# Patient Record
Sex: FEMALE | Race: WHITE | NOT HISPANIC OR LATINO | Employment: OTHER | URBAN - METROPOLITAN AREA
[De-identification: names, ages, dates, MRNs, and addresses within clinical notes are randomized per-mention and may not be internally consistent; named-entity substitution may affect disease eponyms.]

---

## 2017-01-05 ENCOUNTER — TELEPHONE (OUTPATIENT)
Dept: CARDIOLOGY | Facility: CLINIC | Age: 62
End: 2017-01-05

## 2017-01-05 RX ORDER — METOPROLOL SUCCINATE 25 MG/1
TABLET, EXTENDED RELEASE ORAL
Qty: 60 TABLET | Refills: 3 | Status: SHIPPED | OUTPATIENT
Start: 2017-01-05 | End: 2017-01-23 | Stop reason: DRUGHIGH

## 2017-01-05 NOTE — TELEPHONE ENCOUNTER
S/w pt, advised, she will take 2 tablets daily,monitor her b/p & HR x2wks, then email me w/ those results/amk    Med list utd

## 2017-01-05 NOTE — TELEPHONE ENCOUNTER
Pt called today w/ b/p readings as instructed. (see 12/22 ov) She continues to experience intermittent lightheadedness & h/a's which typically correlate w/higher b/p readings.    AM (1hr b/f meds)                                        PM   12//79                                                 140/90  12/24 119/81                                                  140/82  12//80                                                  148/87  12//80                                                  148/86                                   12//82                                                  151/84  12//86                                                  154/89  12//80                                                  153/85  12//74                                                  148/86  12//82                                                  154/72  1/1-118/81                                                      142/83  1/2-122/76                                                      127/82  1/3-145/80                                                      133/79  1/4-118/79                                                      147/94  1/5-128/81      She did not record her HR. She currently takes Toprol 25mg daily.  *As as side note her gyn is weaning her off of Estrace    Please advise. [phone]/amk

## 2017-01-20 NOTE — TELEPHONE ENCOUNTER
Patient faxed over there BP readings.  She is currently taking Toprol 25 mg 2 tablets daily.                     AM                                    PM  1/6/17    137/113  HR66               154/88   HR 61  1/7/17    112/73         71               159/84          66  1/8/17    133/82         62               153/88          69  1/9/17    136/80         64               157/127        66  1/10/17  138/90         63               151/86          66  1/11/17  123/77         68               132/83          69  1/12/17  118/78         70               118/80          68  1/13/17  118/73         68               152/88          65  1/14/17  141/82         63               136/84          67  1/15/17  133/80         66               143/85          69  1/16/17  141/79         60               151/81          66  1/17/17  123/80         69               157/87          73  1/18/17  154/88         60               137/84          63  1/19/17  135/86         64               137/81          72    Please Advise.      Patient's phone number is [phone] or [phone].

## 2017-01-23 NOTE — TELEPHONE ENCOUNTER
Informed patient of dose increase (50mg bid) she was a little concerned about the dose and asked that I call you to confirm this dose./ TUNG

## 2017-01-27 RX ORDER — METOPROLOL SUCCINATE 50 MG/1
50 TABLET, EXTENDED RELEASE ORAL 2 TIMES DAILY
Qty: 60 TABLET | Refills: 1 | Status: SHIPPED | OUTPATIENT
Start: 2017-01-27 | End: 2017-01-27 | Stop reason: DRUGHIGH

## 2017-01-27 RX ORDER — METOPROLOL SUCCINATE 100 MG/1
100 TABLET, EXTENDED RELEASE ORAL DAILY
Qty: 30 TABLET | Refills: 1 | Status: SHIPPED | OUTPATIENT
Start: 2017-01-27 | End: 2017-03-26 | Stop reason: SDUPTHER

## 2017-01-27 RX ORDER — METOPROLOL SUCCINATE 25 MG/1
TABLET, EXTENDED RELEASE ORAL
Qty: 90 TABLET | Refills: 0 | Status: SHIPPED | OUTPATIENT
Start: 2017-01-27 | End: 2017-01-27 | Stop reason: DRUGHIGH

## 2017-01-27 NOTE — TELEPHONE ENCOUNTER
Patient faxed copy of her BP readings (Taking Metoprolol Succ 50 mg bid):                            AM                      PM  1/23/17   130/78 HR 62            147/86   HR 68  1/24/17   130/82       64            141/82         66  1/25/17   141/79       62            141/83         64  1/26/17   146/82       62            134/80         65  1/27/17   125/78       58    Meggan Gamboa reviewed and advised that patient continue the same dose.  Patient does not have an follow up appt.  Patient asked if you want her to monitor and call in another week or if you are OK with her just continuing on from this point.  Patient is aware that we will not be responding back until next week rather to continue recording her readings and calling them in. / TUNG

## 2017-03-27 RX ORDER — METOPROLOL SUCCINATE 100 MG/1
TABLET, EXTENDED RELEASE ORAL
Qty: 90 TABLET | Refills: 1 | Status: SHIPPED | OUTPATIENT
Start: 2017-03-27 | End: 2017-09-27 | Stop reason: SDUPTHER

## 2017-05-04 ENCOUNTER — OFFICE VISIT (OUTPATIENT)
Dept: FAMILY MEDICINE CLINIC | Facility: CLINIC | Age: 62
End: 2017-05-04

## 2017-05-04 VITALS
DIASTOLIC BLOOD PRESSURE: 96 MMHG | TEMPERATURE: 98.3 F | BODY MASS INDEX: 24.83 KG/M2 | OXYGEN SATURATION: 98 % | WEIGHT: 149 LBS | HEIGHT: 65 IN | SYSTOLIC BLOOD PRESSURE: 158 MMHG | HEART RATE: 55 BPM

## 2017-05-04 DIAGNOSIS — I10 ESSENTIAL HYPERTENSION: ICD-10-CM

## 2017-05-04 DIAGNOSIS — I47.1 SUPRAVENTRICULAR TACHYCARDIA (HCC): ICD-10-CM

## 2017-05-04 DIAGNOSIS — Z00.00 PREVENTATIVE HEALTH CARE: ICD-10-CM

## 2017-05-04 DIAGNOSIS — Z13.9 SCREENING: Primary | ICD-10-CM

## 2017-05-04 DIAGNOSIS — R53.82 CHRONIC FATIGUE: ICD-10-CM

## 2017-05-04 DIAGNOSIS — I49.9 IRREGULAR HEART BEATS: Primary | ICD-10-CM

## 2017-05-04 DIAGNOSIS — Z79.899 ENCOUNTER FOR LONG-TERM (CURRENT) USE OF MEDICATIONS: ICD-10-CM

## 2017-05-04 DIAGNOSIS — Z78.0 POSTMENOPAUSAL: ICD-10-CM

## 2017-05-04 PROBLEM — L65.9 HAIR LOSS: Status: ACTIVE | Noted: 2017-05-04

## 2017-05-04 PROBLEM — I47.10 SUPRAVENTRICULAR TACHYCARDIA: Status: ACTIVE | Noted: 2017-05-04

## 2017-05-04 PROBLEM — M19.90 ARTHRITIS: Status: ACTIVE | Noted: 2017-05-04

## 2017-05-04 PROCEDURE — 99214 OFFICE O/P EST MOD 30 MIN: CPT | Performed by: INTERNAL MEDICINE

## 2017-05-04 RX ORDER — AMLODIPINE BESYLATE 2.5 MG/1
2.5 TABLET ORAL DAILY
Qty: 30 TABLET | Refills: 5 | Status: SHIPPED | OUTPATIENT
Start: 2017-05-04 | End: 2017-12-14 | Stop reason: SDDI

## 2017-05-05 LAB
ALBUMIN SERPL-MCNC: 4.5 G/DL (ref 3.5–5.2)
ALBUMIN/GLOB SERPL: 1.9 G/DL
ALP SERPL-CCNC: 84 U/L (ref 39–117)
ALT SERPL-CCNC: 23 U/L (ref 1–33)
AST SERPL-CCNC: 26 U/L (ref 1–32)
BASOPHILS # BLD AUTO: 0.04 10*3/MM3 (ref 0–0.2)
BASOPHILS NFR BLD AUTO: 0.7 % (ref 0–1.5)
BILIRUB SERPL-MCNC: 0.3 MG/DL (ref 0.1–1.2)
BUN SERPL-MCNC: 16 MG/DL (ref 8–23)
BUN/CREAT SERPL: 21.3 (ref 7–25)
CALCIUM SERPL-MCNC: 10.4 MG/DL (ref 8.6–10.5)
CHLORIDE SERPL-SCNC: 102 MMOL/L (ref 98–107)
CHOLEST SERPL-MCNC: 251 MG/DL (ref 0–200)
CO2 SERPL-SCNC: 30.1 MMOL/L (ref 22–29)
CREAT SERPL-MCNC: 0.75 MG/DL (ref 0.57–1)
EOSINOPHIL # BLD AUTO: 0.13 10*3/MM3 (ref 0–0.7)
EOSINOPHIL NFR BLD AUTO: 2.3 % (ref 0.3–6.2)
ERYTHROCYTE [DISTWIDTH] IN BLOOD BY AUTOMATED COUNT: 12.7 % (ref 11.7–13)
FT4I SERPL CALC-MCNC: 1.8 (ref 1.2–4.9)
GLOBULIN SER CALC-MCNC: 2.4 GM/DL
GLUCOSE SERPL-MCNC: 99 MG/DL (ref 65–99)
HCT VFR BLD AUTO: 42.2 % (ref 35.6–45.5)
HDLC SERPL-MCNC: 78 MG/DL (ref 40–60)
HGB BLD-MCNC: 14.3 G/DL (ref 11.9–15.5)
IMM GRANULOCYTES # BLD: 0 10*3/MM3 (ref 0–0.03)
IMM GRANULOCYTES NFR BLD: 0 % (ref 0–0.5)
LDLC SERPL CALC-MCNC: 143 MG/DL (ref 0–100)
LDLC/HDLC SERPL: 1.83 {RATIO}
LYMPHOCYTES # BLD AUTO: 2.06 10*3/MM3 (ref 0.9–4.8)
LYMPHOCYTES NFR BLD AUTO: 36.1 % (ref 19.6–45.3)
MCH RBC QN AUTO: 31.4 PG (ref 26.9–32)
MCHC RBC AUTO-ENTMCNC: 33.9 G/DL (ref 32.4–36.3)
MCV RBC AUTO: 92.7 FL (ref 80.5–98.2)
MONOCYTES # BLD AUTO: 0.35 10*3/MM3 (ref 0.2–1.2)
MONOCYTES NFR BLD AUTO: 6.1 % (ref 5–12)
NEUTROPHILS # BLD AUTO: 3.12 10*3/MM3 (ref 1.9–8.1)
NEUTROPHILS NFR BLD AUTO: 54.8 % (ref 42.7–76)
PLATELET # BLD AUTO: 224 10*3/MM3 (ref 140–500)
POTASSIUM SERPL-SCNC: 5.2 MMOL/L (ref 3.5–5.2)
PROT SERPL-MCNC: 6.9 G/DL (ref 6–8.5)
RBC # BLD AUTO: 4.55 10*6/MM3 (ref 3.9–5.2)
SODIUM SERPL-SCNC: 143 MMOL/L (ref 136–145)
T3RU NFR SERPL: 26 % (ref 24–39)
T4 SERPL-MCNC: 7.1 UG/DL (ref 4.5–12)
TRIGL SERPL-MCNC: 152 MG/DL (ref 0–150)
TSH SERPL DL<=0.005 MIU/L-ACNC: 3.87 UIU/ML (ref 0.45–4.5)
VLDLC SERPL CALC-MCNC: 30.4 MG/DL (ref 5–40)
WBC # BLD AUTO: 5.7 10*3/MM3 (ref 4.5–10.7)

## 2017-05-06 LAB
HCV AB S/CO SERPL IA: <0.1 S/CO RATIO (ref 0–0.9)
Lab: NORMAL
WRITTEN AUTHORIZATION: NORMAL

## 2017-05-08 ENCOUNTER — TELEPHONE (OUTPATIENT)
Dept: CARDIOLOGY | Facility: CLINIC | Age: 62
End: 2017-05-08

## 2017-05-30 ENCOUNTER — TELEPHONE (OUTPATIENT)
Dept: FAMILY MEDICINE CLINIC | Facility: CLINIC | Age: 62
End: 2017-05-30

## 2017-09-27 RX ORDER — METOPROLOL SUCCINATE 100 MG/1
TABLET, EXTENDED RELEASE ORAL
Qty: 90 TABLET | Refills: 0 | Status: SHIPPED | OUTPATIENT
Start: 2017-09-27 | End: 2017-12-14 | Stop reason: SDUPTHER

## 2017-12-14 ENCOUNTER — OFFICE VISIT (OUTPATIENT)
Dept: CARDIOLOGY | Facility: CLINIC | Age: 62
End: 2017-12-14

## 2017-12-14 VITALS
HEART RATE: 57 BPM | HEIGHT: 64 IN | SYSTOLIC BLOOD PRESSURE: 152 MMHG | WEIGHT: 146 LBS | BODY MASS INDEX: 24.92 KG/M2 | DIASTOLIC BLOOD PRESSURE: 84 MMHG

## 2017-12-14 DIAGNOSIS — I10 ESSENTIAL HYPERTENSION: ICD-10-CM

## 2017-12-14 DIAGNOSIS — I47.1 SUPRAVENTRICULAR TACHYCARDIA (HCC): Primary | ICD-10-CM

## 2017-12-14 DIAGNOSIS — E78.2 MIXED HYPERLIPIDEMIA: ICD-10-CM

## 2017-12-14 PROCEDURE — 93000 ELECTROCARDIOGRAM COMPLETE: CPT | Performed by: INTERNAL MEDICINE

## 2017-12-14 PROCEDURE — 99214 OFFICE O/P EST MOD 30 MIN: CPT | Performed by: INTERNAL MEDICINE

## 2017-12-14 RX ORDER — METOPROLOL SUCCINATE 100 MG/1
TABLET, EXTENDED RELEASE ORAL
Qty: 90 TABLET | Refills: 3 | Status: SHIPPED | OUTPATIENT
Start: 2017-12-14 | End: 2019-01-02 | Stop reason: SDUPTHER

## 2017-12-14 RX ORDER — AMLODIPINE BESYLATE 2.5 MG/1
2.5 TABLET ORAL DAILY
Qty: 30 TABLET | Refills: 11 | Status: SHIPPED | OUTPATIENT
Start: 2017-12-14 | End: 2018-12-06 | Stop reason: SDUPTHER

## 2017-12-14 RX ORDER — ATORVASTATIN CALCIUM 20 MG/1
20 TABLET, FILM COATED ORAL DAILY
Qty: 30 TABLET | Refills: 11 | Status: SHIPPED | OUTPATIENT
Start: 2017-12-14 | End: 2018-12-06 | Stop reason: SDUPTHER

## 2017-12-14 NOTE — PROGRESS NOTES
Date of Office Visit: 17  Encounter Provider: Anselmo Velasquez MD  Place of Service: [facility] CARDIOLOGY  Patient Name: Gricel Sheriff  :1955      Chief Complaint   Patient presents with   • Irregular Heart Beat     History of Present Illness  HPI Comments: Ms. Sheriff is a pleasant 62-year-old white female with history of supraventricular tachycardia, hypertension, and hyperlipidemia. She comes in for followup. The patient denies chest pain, pressure and heaviness. No shortness of breath, othopnea or PND. No palpitations, near syncope or syncope. No stroke type symptoms like paralysis, paresthesia, abrupt vision loss and dysarthria. No bleeding like blood in the stool or dark stools.   Unfortunately she's been under significant mental stress.  Her father passed away in the last year or so in she's been taking care of her mom is been more to take care of and she thought in she's been a little more stress than that.  She does have some hard heart beats which are little bit different than the palpitations she doesn't in the past.  She also had blood pressures that she brought in and they are certainly intermittently elevated his eyes 160 systolic with diastolics at times touching 100.  We tried increasing her Toprol her pressure improved we held off on amlodipine but now again high as stated.    Palpitations    Pertinent negatives include no diaphoresis, dizziness, fever, malaise/fatigue, nausea, numbness, vomiting or weakness.         Past Medical History:   Diagnosis Date   • SVT (supraventricular tachycardia)          Past Surgical History:   Procedure Laterality Date   • EXCISIONAL HEMORRHOIDECTOMY  2008    Dr. Winter   • EYE MUSCLE SURGERY  1964    required 2 procedures   • HYSTERECTOMY     • VARICOSE VEIN SURGERY  2010    Lou         Current Outpatient Prescriptions on File Prior to Visit   Medication Sig Dispense Refill   • aspirin 81 MG EC  tablet Take 81 mg by mouth Daily.     • Calcium Carbonate (CALTRATE 600) 1500 (600 CA) MG tablet Take  by mouth Daily.     • Multiple Vitamin (MULTI-DAY VITAMINS PO) Take  by mouth Daily.     • [DISCONTINUED] amLODIPine (NORVASC) 2.5 MG tablet Take 1 tablet by mouth Daily. 30 tablet 5   • [DISCONTINUED] metoprolol succinate XL (TOPROL-XL) 100 MG 24 hr tablet TAKE ONE TABLET BY MOUTH DAILY (Patient taking differently: take 1/2 tablet twice daily) 90 tablet 0     No current facility-administered medications on file prior to visit.          Social History     Social History   • Marital status:      Spouse name: N/A   • Number of children: 1   • Years of education: 12 + 8     Occupational History   • Homemaker      Social History Main Topics   • Smoking status: Never Smoker   • Smokeless tobacco: Never Used   • Alcohol use No      Comment: rare Pacheco drink   • Drug use: No   • Sexual activity: Yes     Partners: Male     Other Topics Concern   • Not on file     Social History Narrative    Retired teacher        Living will at home        Exercise walks quite a bit       Family History   Problem Relation Age of Onset   • Heart attack Mother    • Heart failure Mother    • Hypertension Mother    • Heart disease Father    • Diabetes type II Father    • Breast cancer Maternal Grandmother          Review of Systems   Constitution: Negative for decreased appetite, diaphoresis, fever, weakness, malaise/fatigue, weight gain and weight loss.   HENT: Negative for congestion, hearing loss, nosebleeds and tinnitus.    Eyes: Negative for blurred vision, double vision, vision loss in left eye, vision loss in right eye and visual disturbance.   Cardiovascular: Positive for palpitations.        As noted in HPI   Respiratory:        As noted HPI   Endocrine: Negative for cold intolerance and heat intolerance.   Hematologic/Lymphatic: Negative for bleeding problem. Does not bruise/bleed easily.   Skin: Negative for color change,  "flushing, itching and rash.   Musculoskeletal: Positive for joint pain. Negative for arthritis, back pain, joint swelling, muscle weakness and myalgias.   Gastrointestinal: Negative for bloating, abdominal pain, constipation, diarrhea, dysphagia, heartburn, hematemesis, hematochezia, melena, nausea and vomiting.   Genitourinary: Negative for bladder incontinence, dysuria, frequency, nocturia and urgency.   Neurological: Negative for dizziness, focal weakness, headaches, light-headedness, loss of balance, numbness, paresthesias and vertigo.   Psychiatric/Behavioral: Negative for depression, memory loss and substance abuse.       Procedures      ECG 12 Lead  Date/Time: 12/14/2017 11:22 AM  Performed by: MARY MCCLELLAN  Authorized by: MARY MCCLELLAN   Comparison: compared with previous ECG   Similar to previous ECG  Rhythm: sinus rhythm  Rate: normal  ST Depression: II, III and aVF  QRS axis: normal                 Objective:    /84 (BP Location: Right arm)  Pulse 57  Ht 162.6 cm (64\")  Wt 66.2 kg (146 lb)  BMI 25.06 kg/m2       Physical Exam  Physical Exam   Constitutional: She is oriented to person, place, and time. She appears well-developed and well-nourished. No distress.   HENT:   Head: Normocephalic.   Eyes: Conjunctivae are normal. Pupils are equal, round, and reactive to light. No scleral icterus.   Neck: Normal carotid pulses, no hepatojugular reflux and no JVD present. Carotid bruit is not present. No tracheal deviation, no edema and no erythema present. No thyromegaly present.   Cardiovascular: Normal rate, regular rhythm, S1 normal, S2 normal, normal heart sounds and intact distal pulses.   No extrasystoles are present. PMI is not displaced.  Exam reveals no gallop, no distant heart sounds and no friction rub.    No murmur heard.  Pulses:       Carotid pulses are 2+ on the right side, and 2+ on the left side.       Radial pulses are 2+ on the right side, and 2+ on the left side.        " Femoral pulses are 2+ on the right side, and 2+ on the left side.       Dorsalis pedis pulses are 2+ on the right side, and 2+ on the left side.        Posterior tibial pulses are 2+ on the right side, and 2+ on the left side.   Pulmonary/Chest: Effort normal and breath sounds normal. No respiratory distress. She has no decreased breath sounds. She has no wheezes. She has no rhonchi. She has no rales. She exhibits no tenderness.   Abdominal: Soft. Bowel sounds are normal. She exhibits no distension and no mass. There is no hepatosplenomegaly. There is no tenderness. There is no rebound and no guarding.   Musculoskeletal: She exhibits no edema, tenderness or deformity.   Neurological: She is alert and oriented to person, place, and time.   Skin: Skin is warm and dry. No rash noted. She is not diaphoretic. No cyanosis or erythema. No pallor. Nails show no clubbing.   Psychiatric: She has a normal mood and affect. Her speech is normal and behavior is normal. Judgment and thought content normal.           Assessment:   1.  This is a 62-year-old female with supraventricular tachycardia. Having some different type of palpitations but think these may be related to her high blood pressure will adjust her medication follow her clinically.  2.  History of abnormal chest x-ray nodule that turned out to be benign.  3.   hypertension.  It's obvious the increased dose of metoprolol has not can be enough we'll go ahead and start her on amlodipine 20 mg a day she'll call us if she has problems.  4.  Hyperlipidemia her LDL is 143 total cholesterol 250.  Her American College of cardiology risk of stroke or heart attack in the next 10 years is intermediate at 6.5%.  With proper risk factor modification could be 2.6%.  Rinne started on atorvastatin 20 mg a day try to get her blood pressure under better control.  Addition she's in a try to diet and exercise some and will revisit this in a year.  We can get her on lower doses her off  some of this medication.         Plan:

## 2018-12-06 RX ORDER — ATORVASTATIN CALCIUM 20 MG/1
TABLET, FILM COATED ORAL
Qty: 30 TABLET | Refills: 0 | Status: SHIPPED | OUTPATIENT
Start: 2018-12-06 | End: 2019-01-02 | Stop reason: SDUPTHER

## 2018-12-06 RX ORDER — AMLODIPINE BESYLATE 2.5 MG/1
TABLET ORAL
Qty: 30 TABLET | Refills: 0 | Status: SHIPPED | OUTPATIENT
Start: 2018-12-06 | End: 2019-01-02 | Stop reason: SDUPTHER

## 2018-12-20 ENCOUNTER — TELEPHONE (OUTPATIENT)
Dept: CARDIOLOGY | Facility: CLINIC | Age: 63
End: 2018-12-20

## 2018-12-20 ENCOUNTER — LAB (OUTPATIENT)
Dept: LAB | Facility: HOSPITAL | Age: 63
End: 2018-12-20

## 2018-12-20 ENCOUNTER — OFFICE VISIT (OUTPATIENT)
Dept: CARDIOLOGY | Facility: CLINIC | Age: 63
End: 2018-12-20

## 2018-12-20 VITALS
WEIGHT: 144.2 LBS | SYSTOLIC BLOOD PRESSURE: 140 MMHG | HEIGHT: 65 IN | BODY MASS INDEX: 24.03 KG/M2 | HEART RATE: 56 BPM | DIASTOLIC BLOOD PRESSURE: 80 MMHG

## 2018-12-20 DIAGNOSIS — E78.2 MIXED HYPERLIPIDEMIA: ICD-10-CM

## 2018-12-20 DIAGNOSIS — I47.1 SUPRAVENTRICULAR TACHYCARDIA (HCC): Primary | ICD-10-CM

## 2018-12-20 DIAGNOSIS — E78.2 MIXED HYPERLIPIDEMIA: Primary | ICD-10-CM

## 2018-12-20 DIAGNOSIS — I10 ESSENTIAL HYPERTENSION: ICD-10-CM

## 2018-12-20 LAB
ALBUMIN SERPL-MCNC: 4.4 G/DL (ref 3.5–5.2)
ALP SERPL-CCNC: 114 U/L (ref 39–117)
ALT SERPL W P-5'-P-CCNC: 38 U/L (ref 1–33)
AST SERPL-CCNC: 37 U/L (ref 1–32)
BILIRUB CONJ SERPL-MCNC: <0.2 MG/DL (ref 0–0.3)
BILIRUB INDIRECT SERPL-MCNC: ABNORMAL MG/DL
BILIRUB SERPL-MCNC: 0.6 MG/DL (ref 0.1–1.2)
CHOLEST SERPL-MCNC: 164 MG/DL (ref 0–200)
HDLC SERPL-MCNC: 77 MG/DL (ref 40–60)
LDLC SERPL CALC-MCNC: 72 MG/DL (ref 0–100)
LDLC/HDLC SERPL: 0.94 {RATIO}
PROT SERPL-MCNC: 7.1 G/DL (ref 6–8.5)
TRIGL SERPL-MCNC: 75 MG/DL (ref 0–150)
VLDLC SERPL-MCNC: 15 MG/DL (ref 5–40)

## 2018-12-20 PROCEDURE — 80076 HEPATIC FUNCTION PANEL: CPT

## 2018-12-20 PROCEDURE — 99214 OFFICE O/P EST MOD 30 MIN: CPT | Performed by: INTERNAL MEDICINE

## 2018-12-20 PROCEDURE — 80061 LIPID PANEL: CPT | Performed by: INTERNAL MEDICINE

## 2018-12-20 PROCEDURE — 36415 COLL VENOUS BLD VENIPUNCTURE: CPT

## 2018-12-20 NOTE — TELEPHONE ENCOUNTER
Gricel Sheriff  Female, 63 y.o., 1955  PCP:   Johnnie Curtis MD  Language:   English  Need Interp:   No  Last Weight:   65.4 kg (144 lb 3.2 oz)  Phone:   M: [phone] H: [phone]  Allergies  No Known Allergies  Health Maintenance:   Due  FYI:   None  Primary Ins.:   JESSICA HERNANDEZ CROSS  MRN:   [id number]  MyChart:   Code Exp  Pharmacy:   [address] [phone] [facility] [phone] FX [[id number]]  Preferred Lab:   None  Next Appt with Me:   12/19/2019  Next Appt Date by Dept:   12/19/2019        Contains abnormal data Lipid Panel   Order: [id number]   Status:  Final result   Visible to patient:  No (Not Released) Dx:  Mixed hyperlipidemia    Ref Range & Units 12:02 1yr ago   Total Cholesterol 0 - 200 mg/dL 164  251 Abnormally high     Triglycerides 0 - 150 mg/dL 75  152 Abnormally high     HDL Cholesterol 40 - 60 mg/dL 77 Abnormally high   78 Abnormally high     LDL Cholesterol  0 - 100 mg/dL 72  143 Abnormally high     VLDL Cholesterol 5 - 40 mg/dL 15  30.4    LDL/HDL Ratio  0.94  1.83    Resulting Agency  [id number]      Narrative   Performed by: [facility]   Cholesterol Reference Ranges  (U.S. Department of Health and Human Services ATP III Classifications)    Desirable          <200 mg/dL  Borderline High    200-239 mg/dL  High Risk          >240 mg/dL      Triglyceride Reference Ranges  (U.S. Department of Health and Human Services ATP III Classifications)    Normal           <150 mg/dL  Borderline High  150-199 mg/dL  High             200-499 mg/dL  Very High        >500 mg/dL    HDL Reference Ranges  (U.S. Department of Health and Human Services ATP III Classifcations)    Low     <40 mg/dl (major risk factor for CHD)  High    >60 mg/dl ('negative' risk factor for CHD)        LDL Reference Ranges  (U.S. Department of Health and Human Services ATP III Classifcations)    Optimal          <100 mg/dL  Near Optimal      100-129 mg/dL  Borderline High  130-159 mg/dL  High             160-189 mg/dL  Very High        >189 mg/dL      Specimen Collected: 12/20/18 12:02 Last Resulted: 12/20/18 13:00         Lab Flowsheet       Order Details       View Encounter       Lab and Collection Details       Routing       Result History            Related Result Highlights         Hepatic Function Panel  Final result 12/20/2018                  Status of Other Orders     Expected    Holter Monitor - 24 Hour 12/20/18      Completed     Hepatic Function Panel Abnormal 12/20/18          Encounter Notes      All notes         Routing History     Priority Sent On From To Message Type    12/20/2018  1:00 PM Lab, Background User Anselmo Velasquez MD Results

## 2018-12-20 NOTE — PROGRESS NOTES
Date of Office Visit: 18  Encounter Provider: Anselmo Velasquez MD  Place of Service: [facility] CARDIOLOGY  Patient Name: Gricel Sheriff  :1955  Referral Provider:No ref. provider found      Chief Complaint   Patient presents with   • Palpitations     History of Present Illness  Ms. Sheriff is a pleasant 63-year-old white female with history of supraventricular tachycardia, hypertension, and hyperlipidemia. She comes in for followup. The patient denies chest pain, pressure and heaviness. No shortness of breath, othopnea or PND.  Unfortunately she's having increased episodes of palpitations where she gets this irregular beating but that's different this time it seems to be more significant she feels like she has to catch her breath and coughs is a couple times a day resolves, no associated near syncope or syncope. No stroke type symptoms like paralysis, paresthesia, abrupt vision loss and dysarthria. No bleeding like blood in the stool or dark stools.  She also has has had some fatigue.  She also caring for her mom still which is gradually getting a little more intense to do.      Palpitations    Pertinent negatives include no diaphoresis, dizziness, fever, malaise/fatigue, nausea, numbness, vomiting or weakness.         Past Medical History:   Diagnosis Date   • SVT (supraventricular tachycardia) (CMS/HCC)          Past Surgical History:   Procedure Laterality Date   • EXCISIONAL HEMORRHOIDECTOMY  2008    Dr. Winter   • EYE MUSCLE SURGERY  1964    required 2 procedures   • HYSTERECTOMY     • VARICOSE VEIN SURGERY  2010    Lou         Current Outpatient Medications on File Prior to Visit   Medication Sig Dispense Refill   • amLODIPine (NORVASC) 2.5 MG tablet TAKE ONE TABLET BY MOUTH DAILY 30 tablet 0   • aspirin 81 MG EC tablet Take 81 mg by mouth Daily.     • atorvastatin (LIPITOR) 20 MG tablet TAKE ONE TABLET BY MOUTH DAILY 30 tablet 0   • Calcium  Carbonate (CALTRATE 600) 1500 (600 CA) MG tablet Take  by mouth Daily.     • metoprolol succinate XL (TOPROL-XL) 100 MG 24 hr tablet Take 1/2 tablet twice daily 90 tablet 3   • Multiple Vitamin (MULTI-DAY VITAMINS PO) Take  by mouth Daily.       No current facility-administered medications on file prior to visit.          Social History     Socioeconomic History   • Marital status:      Spouse name: Not on file   • Number of children: 1   • Years of education: 12 + 8   • Highest education level: Not on file   Social Needs   • Financial resource strain: Not on file   • Food insecurity - worry: Not on file   • Food insecurity - inability: Not on file   • Transportation needs - medical: Not on file   • Transportation needs - non-medical: Not on file   Occupational History   • Occupation: Homemaker   Tobacco Use   • Smoking status: Never Smoker   • Smokeless tobacco: Never Used   Substance and Sexual Activity   • Alcohol use: No     Comment: rare Pacheco drink   • Drug use: No   • Sexual activity: Yes     Partners: Male   Other Topics Concern   • Not on file   Social History Narrative    Retired teacher        Living will at home        Exercise walks quite a bit       Family History   Problem Relation Age of Onset   • Heart attack Mother    • Heart failure Mother    • Hypertension Mother    • Heart disease Father    • Diabetes type II Father    • Breast cancer Maternal Grandmother          Review of Systems   Constitution: Negative for decreased appetite, diaphoresis, fever, weakness, malaise/fatigue, weight gain and weight loss.   HENT: Negative for congestion, hearing loss, nosebleeds and tinnitus.    Eyes: Negative for blurred vision, double vision, vision loss in left eye, vision loss in right eye and visual disturbance.   Cardiovascular:        As noted in HPI   Respiratory:        As noted HPI   Endocrine: Negative for cold intolerance and heat intolerance.   Hematologic/Lymphatic: Negative for bleeding  "problem. Does not bruise/bleed easily.   Skin: Negative for color change, flushing, itching and rash.   Musculoskeletal: Negative for arthritis, back pain, joint pain, joint swelling, muscle weakness and myalgias.   Gastrointestinal: Negative for bloating, abdominal pain, constipation, diarrhea, dysphagia, heartburn, hematemesis, hematochezia, melena, nausea and vomiting.   Genitourinary: Negative for bladder incontinence, dysuria, frequency, nocturia and urgency.   Neurological: Negative for dizziness, focal weakness, headaches, light-headedness, loss of balance, numbness, paresthesias and vertigo.   Psychiatric/Behavioral: Negative for depression, memory loss and substance abuse.       Procedures    Procedures        Objective:    /80 (BP Location: Right arm)   Pulse 56   Ht 163.8 cm (64.5\")   Wt 65.4 kg (144 lb 3.2 oz)   BMI 24.37 kg/m²        Physical Exam  Physical Exam   Constitutional: She is oriented to person, place, and time. She appears well-developed and well-nourished. No distress.   HENT:   Head: Normocephalic.   Eyes: Conjunctivae are normal. Pupils are equal, round, and reactive to light. No scleral icterus.   Neck: Normal carotid pulses, no hepatojugular reflux and no JVD present. Carotid bruit is not present. No tracheal deviation, no edema and no erythema present. No thyromegaly present.   Cardiovascular: Normal rate, regular rhythm, S1 normal, S2 normal, normal heart sounds and intact distal pulses.  No extrasystoles are present. PMI is not displaced. Exam reveals no gallop, no distant heart sounds and no friction rub.   No murmur heard.  Pulses:       Carotid pulses are 2+ on the right side, and 2+ on the left side.       Radial pulses are 2+ on the right side, and 2+ on the left side.        Femoral pulses are 2+ on the right side, and 2+ on the left side.       Dorsalis pedis pulses are 2+ on the right side, and 2+ on the left side.        Posterior tibial pulses are 2+ on the right " side, and 2+ on the left side.   Pulmonary/Chest: Effort normal and breath sounds normal. No respiratory distress. She has no decreased breath sounds. She has no wheezes. She has no rhonchi. She has no rales. She exhibits no tenderness.   Abdominal: Soft. Bowel sounds are normal. She exhibits no distension and no mass. There is no hepatosplenomegaly. There is no tenderness. There is no rebound and no guarding.   Musculoskeletal: She exhibits no edema, tenderness or deformity.   Neurological: She is alert and oriented to person, place, and time.   Skin: Skin is warm and dry. No rash noted. She is not diaphoretic. No cyanosis or erythema. No pallor. Nails show no clubbing.   Psychiatric: She has a normal mood and affect. Her speech is normal and behavior is normal. Judgment and thought content normal.           Assessment:   1.  This is a 63-year-old female with supraventricular tachycardia.  unfortunately she's having increased palpitations and probably increased episodes of SVT.  She's getting them daily she'll wear a 24-hour Holter monitor.  If that confirms increased SVT will refer her to arrhythmia service.  2.  History of abnormal chest x-ray nodule that turned out to be benign.  3.  Hypertension.   blood pressure stable to continue the same.  4.  Hyperlipidemia on atorvastatin to have a follow up lipid profile. If stable we will continue the same.         Plan:

## 2018-12-26 ENCOUNTER — TELEPHONE (OUTPATIENT)
Dept: CARDIOLOGY | Facility: CLINIC | Age: 63
End: 2018-12-26

## 2018-12-26 NOTE — TELEPHONE ENCOUNTER
Result Text        · A relatively benign monitor study.     1.  Normal sinus rhythm  2.  Rare predominantly unifocal PVCs.  One couplet identified.  No ventricular tachycardia.  3.  Rare PACs.  No atrial fibrillation or SVT identified  4.  No pauses or high degree AV block  5.  No symptoms reported

## 2019-01-02 RX ORDER — AMLODIPINE BESYLATE 2.5 MG/1
2.5 TABLET ORAL DAILY
Qty: 90 TABLET | Refills: 2 | Status: SHIPPED | OUTPATIENT
Start: 2019-01-02 | End: 2019-09-29 | Stop reason: SDUPTHER

## 2019-01-02 RX ORDER — METOPROLOL SUCCINATE 100 MG/1
TABLET, EXTENDED RELEASE ORAL
Qty: 90 TABLET | Refills: 2 | Status: SHIPPED | OUTPATIENT
Start: 2019-01-02 | End: 2019-09-29 | Stop reason: SDUPTHER

## 2019-01-02 RX ORDER — ATORVASTATIN CALCIUM 20 MG/1
20 TABLET, FILM COATED ORAL DAILY
Qty: 90 TABLET | Refills: 1 | Status: SHIPPED | OUTPATIENT
Start: 2019-01-02 | End: 2019-06-30 | Stop reason: SDUPTHER

## 2019-05-24 ENCOUNTER — TRANSCRIBE ORDERS (OUTPATIENT)
Dept: ADMINISTRATIVE | Facility: HOSPITAL | Age: 64
End: 2019-05-24

## 2019-05-24 DIAGNOSIS — I47.1 SVT (SUPRAVENTRICULAR TACHYCARDIA) (HCC): Primary | ICD-10-CM

## 2019-05-30 ENCOUNTER — APPOINTMENT (OUTPATIENT)
Dept: GENERAL RADIOLOGY | Facility: HOSPITAL | Age: 64
End: 2019-05-30

## 2019-07-01 RX ORDER — ATORVASTATIN CALCIUM 20 MG/1
TABLET, FILM COATED ORAL
Qty: 90 TABLET | Refills: 1 | Status: SHIPPED | OUTPATIENT
Start: 2019-07-01 | End: 2019-12-30

## 2019-07-01 NOTE — TELEPHONE ENCOUNTER
Received Lipids per Dr. Johnnie Curtis. Refilled atorvastatin 20 mg to local pharmacy. Called pt back and informed her refill was sent in. She Verbalized understanding.  /Nivia

## 2019-07-01 NOTE — TELEPHONE ENCOUNTER
Spoke w/ pt. RE: Medication refill. Told pt. She was overdue for lipid panel labs. Pt. Stated she had blood work done at general physician's office recently and will call to see if they were lipids done. Told pt if yes, have them faxed to [facility] [phone]. Once received. I will put in refill for atorvastatin 20 mg. She verbalized understanding.   /Nivia

## 2019-07-02 ENCOUNTER — TELEPHONE (OUTPATIENT)
Dept: CARDIOLOGY | Facility: CLINIC | Age: 64
End: 2019-07-02

## 2019-09-30 RX ORDER — METOPROLOL SUCCINATE 100 MG/1
TABLET, EXTENDED RELEASE ORAL
Qty: 90 TABLET | Refills: 1 | Status: SHIPPED | OUTPATIENT
Start: 2019-09-30 | End: 2020-03-20

## 2019-09-30 RX ORDER — AMLODIPINE BESYLATE 2.5 MG/1
TABLET ORAL
Qty: 90 TABLET | Refills: 1 | Status: SHIPPED | OUTPATIENT
Start: 2019-09-30 | End: 2020-03-20

## 2019-11-20 NOTE — TELEPHONE ENCOUNTER
Those look good she should check twice a day 1-2 times a month. She should keep her apt with me in a year.DOMINICK   Alert and oriented, no focal deficits, no motor or sensory deficits.

## 2019-12-19 ENCOUNTER — OFFICE VISIT (OUTPATIENT)
Dept: CARDIOLOGY | Facility: CLINIC | Age: 64
End: 2019-12-19

## 2019-12-19 VITALS
BODY MASS INDEX: 25.44 KG/M2 | SYSTOLIC BLOOD PRESSURE: 120 MMHG | HEART RATE: 52 BPM | WEIGHT: 149 LBS | HEIGHT: 64 IN | DIASTOLIC BLOOD PRESSURE: 70 MMHG

## 2019-12-19 DIAGNOSIS — I10 ESSENTIAL HYPERTENSION: ICD-10-CM

## 2019-12-19 DIAGNOSIS — I49.3 PVC (PREMATURE VENTRICULAR CONTRACTION): ICD-10-CM

## 2019-12-19 DIAGNOSIS — E78.2 MIXED HYPERLIPIDEMIA: ICD-10-CM

## 2019-12-19 DIAGNOSIS — I47.1 SUPRAVENTRICULAR TACHYCARDIA (HCC): Primary | ICD-10-CM

## 2019-12-19 PROCEDURE — 93000 ELECTROCARDIOGRAM COMPLETE: CPT | Performed by: INTERNAL MEDICINE

## 2019-12-19 PROCEDURE — 99214 OFFICE O/P EST MOD 30 MIN: CPT | Performed by: INTERNAL MEDICINE

## 2019-12-19 NOTE — PROGRESS NOTES
Date of Office Visit: 19  Encounter Provider: Anselmo Velasquez MD  Place of Service: [facility] CARDIOLOGY  Patient Name: Gricel Sheriff  :1955  Referral Provider:Anselmo Velasquez MD      Chief Complaint   Patient presents with   • Supraventricular tachycardia     History of Present Illness  Ms. Sheriff is a pleasant 64-year-old white female with history of supraventricular tachycardia, hypertension, and hyperlipidemia. She comes in for followup. The patient denies chest pain, pressure and heaviness. No shortness of breath, othopnea or PND. No palpitations, near syncope or syncope. No stroke type symptoms like paralysis, paresthesia, abrupt vision loss and dysarthria. No bleeding like blood in the stool or dark stools.  She does still get occasional fluttering but much better than it was last year she typically notices it when she lies down it lasts a few seconds gets maybe 2 or 3 times a week no associated symptoms with it.  The stress at home is also been a lot less since she was last year.    Palpitations    Pertinent negatives include no diaphoresis, dizziness, fever, malaise/fatigue, nausea, numbness, vomiting or weakness.         Past Medical History:   Diagnosis Date   • SVT (supraventricular tachycardia) (CMS/HCC)          Past Surgical History:   Procedure Laterality Date   • EXCISIONAL HEMORRHOIDECTOMY  2008    Dr. Winter   • EYE MUSCLE SURGERY  1964    required 2 procedures   • HYSTERECTOMY     • VARICOSE VEIN SURGERY  2010    Lou         Current Outpatient Medications on File Prior to Visit   Medication Sig Dispense Refill   • amLODIPine (NORVASC) 2.5 MG tablet TAKE ONE TABLET BY MOUTH DAILY 90 tablet 1   • aspirin 81 MG EC tablet Take 81 mg by mouth Daily.     • atorvastatin (LIPITOR) 20 MG tablet TAKE ONE TABLET BY MOUTH DAILY 90 tablet 1   • Calcium Carbonate (CALTRATE 600) 1500 (600 CA) MG tablet Take  by mouth Daily.     • metoprolol  succinate XL (TOPROL-XL) 100 MG 24 hr tablet TAKE ONE-HALF TABLET BY MOUTH TWICE A DAY 90 tablet 1   • Multiple Vitamin (MULTI-DAY VITAMINS PO) Take  by mouth Daily.       No current facility-administered medications on file prior to visit.          Social History     Socioeconomic History   • Marital status:      Spouse name: Not on file   • Number of children: 1   • Years of education: 12 + 8   • Highest education level: Not on file   Occupational History   • Occupation: Homemaker   Tobacco Use   • Smoking status: Never Smoker   • Smokeless tobacco: Never Used   • Tobacco comment: Daily caffeine use   Substance and Sexual Activity   • Alcohol use: No     Comment: rare [place] drink   • Drug use: No   • Sexual activity: Yes     Partners: Male   Lifestyle   • Physical activity:     Days per week: 5 days     Minutes per session: 30 min   • Stress: Not on file   Social History Narrative    Retired teacher        Living will at home        Exercise walks quite a bit       Family History   Problem Relation Age of Onset   • Heart attack Mother    • Heart failure Mother    • Hypertension Mother    • Heart disease Father    • Diabetes type II Father    • Breast cancer Maternal Grandmother          Review of Systems   Constitution: Negative for decreased appetite, diaphoresis, fever, malaise/fatigue, weight gain and weight loss.   HENT: Positive for hearing loss. Negative for congestion, nosebleeds and tinnitus.    Eyes: Negative for blurred vision, double vision, vision loss in left eye, vision loss in right eye and visual disturbance.   Cardiovascular:        As noted in HPI   Respiratory:        As noted HPI   Endocrine: Negative for cold intolerance and heat intolerance.   Hematologic/Lymphatic: Negative for bleeding problem. Does not bruise/bleed easily.   Skin: Negative for color change, flushing, itching and rash.   Musculoskeletal: Positive for joint pain. Negative for arthritis, back pain, joint swelling,  "muscle weakness and myalgias.   Gastrointestinal: Negative for bloating, abdominal pain, constipation, diarrhea, dysphagia, heartburn, hematemesis, hematochezia, melena, nausea and vomiting.   Genitourinary: Negative for bladder incontinence, dysuria, frequency, nocturia and urgency.   Neurological: Negative for dizziness, focal weakness, headaches, light-headedness, loss of balance, numbness, paresthesias, vertigo and weakness.   Psychiatric/Behavioral: Negative for depression, memory loss and substance abuse.       Procedures      ECG 12 Lead  Date/Time: 12/19/2019 12:06 PM  Performed by: Anselmo Velasquez MD  Authorized by: Anselmo Velasquez MD   Comparison: compared with previous ECG   Similar to previous ECG  Rate: normal  ST Depression: II, III, aVF and V6  QRS axis: normal                  Objective:    /70   Pulse 52   Ht 162.6 cm (64\")   Wt 67.6 kg (149 lb)   BMI 25.58 kg/m²        Physical Exam  Physical Exam   Constitutional: She is oriented to person, place, and time. She appears well-developed and well-nourished. No distress.   HENT:   Head: Normocephalic.   Eyes: Pupils are equal, round, and reactive to light. Conjunctivae are normal. No scleral icterus.   Neck: Normal carotid pulses, no hepatojugular reflux and no JVD present. Carotid bruit is not present. No tracheal deviation, no edema and no erythema present. No thyromegaly present.   Cardiovascular: Normal rate, regular rhythm, S1 normal, S2 normal, normal heart sounds and intact distal pulses.  Occasional extrasystoles are present. PMI is not displaced. Exam reveals no gallop, no distant heart sounds and no friction rub.   No murmur heard.  Pulses:       Carotid pulses are 2+ on the right side, and 2+ on the left side.       Radial pulses are 2+ on the right side, and 2+ on the left side.        Femoral pulses are 2+ on the right side, and 2+ on the left side.       Dorsalis pedis pulses are 2+ on the right side, and 2+ on the left " side.        Posterior tibial pulses are 2+ on the right side, and 2+ on the left side.   Pulmonary/Chest: Effort normal and breath sounds normal. No respiratory distress. She has no decreased breath sounds. She has no wheezes. She has no rhonchi. She has no rales. She exhibits no tenderness.   Abdominal: Soft. Bowel sounds are normal. She exhibits no distension and no mass. There is no hepatosplenomegaly. There is no tenderness. There is no rebound and no guarding.   Musculoskeletal: She exhibits no edema, tenderness or deformity.   Neurological: She is alert and oriented to person, place, and time.   Skin: Skin is warm and dry. No rash noted. She is not diaphoretic. No cyanosis or erythema. No pallor. Nails show no clubbing.   Psychiatric: She has a normal mood and affect. Her speech is normal and behavior is normal. Judgment and thought content normal.           Assessment:   1.  This is a 63-year-old female with supraventricular tachycardia.    Seems to be doing better.  She does have some evidence of PVCs also these also appear to be benign.  She is stable we will continue the same see us again in follow-up in a year.  2.  History of abnormal chest x-ray nodule that turned out to be benign.  3.  Hypertension.   blood pressure stable to continue the same.  4.  Hyperlipidemia on atorvastatin had follow-up lipid profiles in PCP however the year before that they were great.         Plan:

## 2019-12-30 RX ORDER — ATORVASTATIN CALCIUM 20 MG/1
TABLET, FILM COATED ORAL
Qty: 90 TABLET | Refills: 3 | Status: SHIPPED | OUTPATIENT
Start: 2019-12-30 | End: 2020-12-21 | Stop reason: SDUPTHER

## 2020-02-16 NOTE — PROGRESS NOTES
Date of Office Visit: 2019  Encounter Provider: Zoila Ferreira MA  Place of Service: [facility] CARDIOLOGY  Patient Name: Gricel Sheriff  :1955    Chief complaint      History of Present Illness      Past Medical History:   Diagnosis Date   • SVT (supraventricular tachycardia) (CMS/HCC)      Past Surgical History:   Procedure Laterality Date   • EXCISIONAL HEMORRHOIDECTOMY  2008    Dr. Winter   • EYE MUSCLE SURGERY  1964    required 2 procedures   • HYSTERECTOMY     • VARICOSE VEIN SURGERY  2010    Lou     Outpatient Medications Prior to Visit   Medication Sig Dispense Refill   • amLODIPine (NORVASC) 2.5 MG tablet TAKE ONE TABLET BY MOUTH DAILY 90 tablet 1   • aspirin 81 MG EC tablet Take 81 mg by mouth Daily.     • atorvastatin (LIPITOR) 20 MG tablet TAKE ONE TABLET BY MOUTH DAILY 90 tablet 1   • Calcium Carbonate (CALTRATE 600) 1500 (600 CA) MG tablet Take  by mouth Daily.     • metoprolol succinate XL (TOPROL-XL) 100 MG 24 hr tablet TAKE ONE-HALF TABLET BY MOUTH TWICE A DAY 90 tablet 1   • Multiple Vitamin (MULTI-DAY VITAMINS PO) Take  by mouth Daily.       No facility-administered medications prior to visit.        Allergies as of 2019   • (No Known Allergies)     Social History     Socioeconomic History   • Marital status:      Spouse name: Not on file   • Number of children: 1   • Years of education: 12 + 8   • Highest education level: Not on file   Occupational History   • Occupation: Homemaker   Tobacco Use   • Smoking status: Never Smoker   • Smokeless tobacco: Never Used   • Tobacco comment: Daily caffeine use   Substance and Sexual Activity   • Alcohol use: No     Comment: rare [place] drink   • Drug use: No   • Sexual activity: Yes     Partners: Male   Lifestyle   • Physical activity:     Days per week: 5 days     Minutes per session: 30 min   • Stress: Not on file   Social History Narrative    Retired teacher         "Living will at home        Exercise walks quite a bit     Family History   Problem Relation Age of Onset   • Heart attack Mother    • Heart failure Mother    • Hypertension Mother    • Heart disease Father    • Diabetes type II Father    • Breast cancer Maternal Grandmother      [place]     Objective:     Vitals:    12/19/19 1158   BP: 120/70   Pulse: 52   Weight: 67.6 kg (149 lb)   Height: 162.6 cm (64\")     Body mass index is 25.58 kg/m².    Physical Exam  Lab Review:     ECG 12 Lead  Date/Time: 12/19/2019 11:58 AM  Performed by: Anselmo Velasquez MD  Authorized by: Anselmo Velasquez MD             Assessment:    No diagnosis found.  Plan:                  Your medication list           Accurate as of December 19, 2019 11:58 AM. If you have any questions, ask your nurse or doctor.               CONTINUE taking these medications      Instructions Last Dose Given Next Dose Due   amLODIPine 2.5 MG tablet  Commonly known as:  NORVASC      TAKE ONE TABLET BY MOUTH DAILY       aspirin 81 MG EC tablet      Take 81 mg by mouth Daily.       atorvastatin 20 MG tablet  Commonly known as:  LIPITOR      TAKE ONE TABLET BY MOUTH DAILY       CALTRATE 600 1500 (600 Ca) MG tablet  Generic drug:  Calcium Carbonate      Take  by mouth Daily.       metoprolol succinate  MG 24 hr tablet  Commonly known as:  TOPROL-XL      TAKE ONE-HALF TABLET BY MOUTH TWICE A DAY       MULTI-DAY VITAMINS PO      Take  by mouth Daily.              Patient is no longer taking -.  I corrected the med list to reflect this.  I did not stop these medications.    Dictated utilizing Dragon dictation  " 5

## 2020-03-20 RX ORDER — METOPROLOL SUCCINATE 100 MG/1
TABLET, EXTENDED RELEASE ORAL
Qty: 90 TABLET | Refills: 0 | Status: SHIPPED | OUTPATIENT
Start: 2020-03-20 | End: 2020-06-26

## 2020-03-20 RX ORDER — AMLODIPINE BESYLATE 2.5 MG/1
TABLET ORAL
Qty: 90 TABLET | Refills: 0 | Status: SHIPPED | OUTPATIENT
Start: 2020-03-20 | End: 2020-06-26

## 2020-06-26 RX ORDER — METOPROLOL SUCCINATE 100 MG/1
TABLET, EXTENDED RELEASE ORAL
Qty: 90 TABLET | Refills: 1 | Status: SHIPPED | OUTPATIENT
Start: 2020-06-26 | End: 2020-12-21 | Stop reason: SDUPTHER

## 2020-06-26 RX ORDER — AMLODIPINE BESYLATE 2.5 MG/1
TABLET ORAL
Qty: 90 TABLET | Refills: 1 | Status: SHIPPED | OUTPATIENT
Start: 2020-06-26 | End: 2020-12-21 | Stop reason: SDUPTHER

## 2020-12-21 RX ORDER — AMLODIPINE BESYLATE 2.5 MG/1
2.5 TABLET ORAL DAILY
Qty: 90 TABLET | Refills: 2 | Status: SHIPPED | OUTPATIENT
Start: 2020-12-21 | End: 2021-09-22

## 2020-12-21 RX ORDER — METOPROLOL SUCCINATE 100 MG/1
50 TABLET, EXTENDED RELEASE ORAL 2 TIMES DAILY
Qty: 90 TABLET | Refills: 2 | Status: SHIPPED | OUTPATIENT
Start: 2020-12-21 | End: 2021-01-14 | Stop reason: SDUPTHER

## 2020-12-21 RX ORDER — ATORVASTATIN CALCIUM 20 MG/1
20 TABLET, FILM COATED ORAL DAILY
Qty: 90 TABLET | Refills: 3 | Status: SHIPPED | OUTPATIENT
Start: 2020-12-21 | End: 2021-12-20 | Stop reason: SDUPTHER

## 2020-12-21 NOTE — TELEPHONE ENCOUNTER
Eva patient  Requesting refill on Metoprolol Succinate, Amlidopine and Atorvastatin.   Last seen in office 12/19/2019  EKG 12/19/2019  Labs 5/23/2019

## 2021-01-14 ENCOUNTER — OFFICE VISIT (OUTPATIENT)
Dept: CARDIOLOGY | Facility: CLINIC | Age: 66
End: 2021-01-14

## 2021-01-14 VITALS
DIASTOLIC BLOOD PRESSURE: 62 MMHG | HEIGHT: 64 IN | HEART RATE: 52 BPM | BODY MASS INDEX: 24.92 KG/M2 | SYSTOLIC BLOOD PRESSURE: 130 MMHG | WEIGHT: 146 LBS

## 2021-01-14 DIAGNOSIS — I47.1 SUPRAVENTRICULAR TACHYCARDIA (HCC): Primary | ICD-10-CM

## 2021-01-14 DIAGNOSIS — I10 ESSENTIAL HYPERTENSION: ICD-10-CM

## 2021-01-14 DIAGNOSIS — I49.3 PVC (PREMATURE VENTRICULAR CONTRACTION): ICD-10-CM

## 2021-01-14 DIAGNOSIS — E78.2 MIXED HYPERLIPIDEMIA: ICD-10-CM

## 2021-01-14 PROCEDURE — 93000 ELECTROCARDIOGRAM COMPLETE: CPT | Performed by: NURSE PRACTITIONER

## 2021-01-14 PROCEDURE — 99213 OFFICE O/P EST LOW 20 MIN: CPT | Performed by: NURSE PRACTITIONER

## 2021-01-14 RX ORDER — METOPROLOL SUCCINATE 50 MG/1
50 TABLET, EXTENDED RELEASE ORAL 2 TIMES DAILY
Qty: 60 TABLET | Refills: 11
Start: 2021-01-14 | End: 2021-09-22

## 2021-01-14 NOTE — PROGRESS NOTES
"Date of Office Visit: 21  Encounter Provider: FILEMON Page  Place of Service: [facility] CARDIOLOGY  Patient Name: Gricel Sheriff  :1955    Chief Complaint   Patient presents with   • Supraventricular tachycardia (   • Follow-up   :     HPI: Gricel Sheriff is a 65 y.o. female  with history of hypertension, hyperlipidemia, supraventricular tachycardia, premature ventricular contraction.  She has been followed by Dr. Anselmo Velasquez.  I reviewed her last office visit note.  I will visit with her for the first time today.    She has been treated with metoprolol for history of SVT.  She was later noted to have premature ventricular contraction which was felt to be benign.  She now presents for reassessment.  She continues to take aspirin without any signs of bleeding.  She has occasional palpitations maybe twice a week.  These are sporadic and not limiting.  She also reports a deep skipped beat on occasion it makes her catch her breath.  She walks 3 days a week outside for 30 minutes when the weather permits.  She has no exertional symptoms with that.  She denies lightheadedness, shortness of breath, edema, chest pain tightness pressure.  He stays active.  She has occasional fatigue which is chronic and unchanged.  She checks her blood pressure at home which is reportedly good.        No Known Allergies        Family and social history reviewed.     ROS  All other systems were reviewed and are negative          Objective:     Vitals:    21 0858   BP: 130/62   BP Location: Left arm   Patient Position: Sitting   Pulse: 52   Weight: 66.2 kg (146 lb)   Height: 162.6 cm (64\")     Body mass index is 25.06 kg/m².    PHYSICAL EXAM:  Constitutional:       General: Not in acute distress.     Appearance: Well-developed. Not diaphoretic.   HENT:      Head: Normocephalic.   Pulmonary:      Effort: Pulmonary effort is normal. No respiratory distress.      Breath sounds: " Normal breath sounds. No wheezing. No rhonchi. No rales.   Cardiovascular:      Normal rate. Regular rhythm.   Pulses:     Radial: 2+ bilaterally.  Skin:     General: Skin is warm and dry. There is no cyanosis.      Findings: No rash.   Neurological:      Mental Status: Alert and oriented to person, place, and time.   Psychiatric:         Behavior: Behavior normal.         Thought Content: Thought content normal.         Judgment: Judgment normal.           ECG 12 Lead    Date/Time: 1/14/2021 9:13 AM  Performed by: Lovely Sahu APRN  Authorized by: Lovely Sahu APRN   Comparison: compared with previous ECG   Similar to previous ECG  Rhythm: sinus rhythm  Rate: normal  Other findings: non-specific ST-T wave changes  Comments: No ischemic changes              Current Outpatient Medications   Medication Sig Dispense Refill   • amLODIPine (NORVASC) 2.5 MG tablet Take 1 tablet by mouth Daily. 90 tablet 2   • aspirin 81 MG EC tablet Take 81 mg by mouth Daily.     • atorvastatin (LIPITOR) 20 MG tablet Take 1 tablet by mouth Daily. 90 tablet 3   • Calcium Carbonate (CALTRATE 600) 1500 (600 CA) MG tablet Take  by mouth Daily.     • metoprolol succinate XL (TOPROL-XL) 100 MG 24 hr tablet Take 0.5 tablets by mouth 2 (Two) Times a Day. 90 tablet 2   • Multiple Vitamin (MULTI-DAY VITAMINS PO) Take  by mouth Daily.       No current facility-administered medications for this visit.      Assessment:       Diagnosis Plan   1. Supraventricular tachycardia (CMS/HCC)     2. Essential hypertension     3. Mixed hyperlipidemia     4. PVC (premature ventricular contraction)          No orders of the defined types were placed in this encounter.        Plan:       1.  This is a 65-year-old female with supraventricular tachycardia and evidence of PVCs also these also appear to be benign.  continue ASA and beta blocker a current doses  2.  History of abnormal chest x-ray nodule that turned out to be benign.  3.  Hypertension.  blood  pressure stable to continue the same. She follows this at home  4.  Hyperlipidemia on atorvastatin 20 mg. Reviewed lipids from 05/2019 which were very good  5. History of varicose vein surgery     I will transition her care to Dr. Peraza. She is to call me with any questions or concerns.                It has been a pleasure to participate in this patient's care.      Thank you,  FILEMON Page      **I used Dragon to dictate this note:**

## 2021-02-28 ENCOUNTER — IMMUNIZATION (OUTPATIENT)
Dept: VACCINE CLINIC | Facility: HOSPITAL | Age: 66
End: 2021-02-28

## 2021-02-28 PROCEDURE — 0001A: CPT | Performed by: INTERNAL MEDICINE

## 2021-02-28 PROCEDURE — 91300 HC SARSCOV02 VAC 30MCG/0.3ML IM: CPT | Performed by: INTERNAL MEDICINE

## 2021-03-21 ENCOUNTER — IMMUNIZATION (OUTPATIENT)
Dept: VACCINE CLINIC | Facility: HOSPITAL | Age: 66
End: 2021-03-21

## 2021-03-21 PROCEDURE — 91300 HC SARSCOV02 VAC 30MCG/0.3ML IM: CPT | Performed by: INTERNAL MEDICINE

## 2021-03-21 PROCEDURE — 0002A: CPT | Performed by: INTERNAL MEDICINE

## 2021-09-22 RX ORDER — METOPROLOL SUCCINATE 100 MG/1
TABLET, EXTENDED RELEASE ORAL
Qty: 90 TABLET | Refills: 2 | Status: SHIPPED | OUTPATIENT
Start: 2021-09-22 | End: 2021-12-20 | Stop reason: SDUPTHER

## 2021-09-22 RX ORDER — AMLODIPINE BESYLATE 2.5 MG/1
TABLET ORAL
Qty: 90 TABLET | Refills: 2 | Status: SHIPPED | OUTPATIENT
Start: 2021-09-22 | End: 2021-12-20 | Stop reason: SDUPTHER

## 2021-12-04 ENCOUNTER — IMMUNIZATION (OUTPATIENT)
Dept: VACCINE CLINIC | Facility: HOSPITAL | Age: 66
End: 2021-12-04

## 2021-12-04 PROCEDURE — 0004A HC ADM SARSCOV2 30MCG/0.3ML BOOSTER: CPT | Performed by: INTERNAL MEDICINE

## 2021-12-04 PROCEDURE — 91300 HC SARSCOV02 VAC 30MCG/0.3ML IM: CPT | Performed by: INTERNAL MEDICINE

## 2021-12-20 RX ORDER — AMLODIPINE BESYLATE 2.5 MG/1
2.5 TABLET ORAL DAILY
Qty: 90 TABLET | Refills: 2 | Status: SHIPPED | OUTPATIENT
Start: 2021-12-20 | End: 2022-09-12

## 2021-12-20 RX ORDER — ATORVASTATIN CALCIUM 20 MG/1
20 TABLET, FILM COATED ORAL DAILY
Qty: 90 TABLET | Refills: 3 | Status: SHIPPED | OUTPATIENT
Start: 2021-12-20 | End: 2022-12-16

## 2021-12-20 RX ORDER — METOPROLOL SUCCINATE 100 MG/1
50 TABLET, EXTENDED RELEASE ORAL 2 TIMES DAILY
Qty: 90 TABLET | Refills: 2 | Status: SHIPPED | OUTPATIENT
Start: 2021-12-20 | End: 2022-09-12

## 2022-01-20 ENCOUNTER — OFFICE VISIT (OUTPATIENT)
Dept: CARDIOLOGY | Facility: CLINIC | Age: 67
End: 2022-01-20

## 2022-01-20 VITALS
OXYGEN SATURATION: 98 % | BODY MASS INDEX: 23.56 KG/M2 | WEIGHT: 138 LBS | HEIGHT: 64 IN | HEART RATE: 55 BPM | RESPIRATION RATE: 16 BRPM | DIASTOLIC BLOOD PRESSURE: 74 MMHG | SYSTOLIC BLOOD PRESSURE: 126 MMHG

## 2022-01-20 DIAGNOSIS — I47.1 SUPRAVENTRICULAR TACHYCARDIA: ICD-10-CM

## 2022-01-20 DIAGNOSIS — I10 ESSENTIAL HYPERTENSION: ICD-10-CM

## 2022-01-20 DIAGNOSIS — R00.2 PALPITATIONS: Primary | ICD-10-CM

## 2022-01-20 PROCEDURE — 93000 ELECTROCARDIOGRAM COMPLETE: CPT | Performed by: INTERNAL MEDICINE

## 2022-01-20 PROCEDURE — 99213 OFFICE O/P EST LOW 20 MIN: CPT | Performed by: INTERNAL MEDICINE

## 2022-01-20 RX ORDER — ACETAMINOPHEN 160 MG/5ML
SUSPENSION, ORAL (FINAL DOSE FORM) ORAL
COMMUNITY

## 2022-01-20 NOTE — PROGRESS NOTES
CARDIOLOGY    Zoila Peraza MD    ENCOUNTER DATE:  01/20/2022    Gricel Sheriff / 66 y.o. / female        CHIEF COMPLAINT / REASON FOR OFFICE VISIT     Rapid Heart Rate (1 year f/u )      HISTORY OF PRESENT ILLNESS       HPI    Gricel Sheriff is a 66 y.o. female     This is a patient who previously followed with Dr. Velasquez. She has hypertension, hyperlipidemia, supraventricular tachycardia, and PVC's. She comes in today for follow up. Overall, she has been feeling well. She denies any shortness of breath, edema, or chest pain. She tries to exercise and walk when the weather is good. She has an occasional palpitation, which sounds very consistent with a premature ventricular contraction. She notices them at rest. No exacerbating factors. She feels like she has to cough when she has one.       REVIEW OF SYSTEMS     Review of Systems   Constitutional: Negative for fever, malaise/fatigue, weight gain and weight loss.   HENT: Negative for ear pain, hearing loss, nosebleeds and sore throat.    Eyes: Negative for double vision, pain, vision loss in left eye and vision loss in right eye.   Cardiovascular:        See history of present illness.   Respiratory: Negative for cough, shortness of breath, sleep disturbances due to breathing, snoring and wheezing.    Endocrine: Negative for cold intolerance, heat intolerance and polyuria.   Skin: Negative for itching, poor wound healing and rash.   Musculoskeletal: Negative for joint pain, joint swelling and myalgias.   Gastrointestinal: Negative for abdominal pain, diarrhea, hematochezia, nausea and vomiting.   Genitourinary: Negative for hematuria and hesitancy.   Neurological: Negative for numbness, paresthesias and seizures.   Psychiatric/Behavioral: Negative for depression. The patient is not nervous/anxious.          VITAL SIGNS     Visit Vitals  /74 (BP Location: Left arm, Patient Position: Sitting, Cuff Size: Adult)   Pulse 55   Resp 16   Ht 162.6 cm  "(64\")   Wt 62.6 kg (138 lb)   SpO2 98%   BMI 23.69 kg/m²         Wt Readings from Last 3 Encounters:   01/20/22 62.6 kg (138 lb)   01/14/21 66.2 kg (146 lb)   12/19/19 67.6 kg (149 lb)     Body mass index is 23.69 kg/m².      PHYSICAL EXAMINATION     Constitutional:       General: Not in acute distress.  Neck:      Vascular: No carotid bruit or JVD.   Pulmonary:      Effort: Pulmonary effort is normal.      Breath sounds: Normal breath sounds.   Cardiovascular:      Normal rate. Regular rhythm.      Murmurs: There is no murmur.   Psychiatric:         Mood and Affect: Mood and affect normal.           REVIEWED DATA       ECG 12 Lead    Date/Time: 1/20/2022 10:34 AM  Performed by: Zoila Peraza MD  Authorized by: Zoila Peraza MD   Comparison: compared with previous ECG from 1/20/2021  Similar to previous ECG  Rhythm: sinus rhythm  BPM: 55  Conduction: conduction normal  ST Segments: ST segments normal  T Waves: T waves normal    Clinical impression: normal ECG                  Lab Results   Component Value Date    GLUCOSE 99 05/04/2017    BUN 16 05/04/2017    CREATININE 0.75 05/04/2017    EGFRIFNONA 79 05/04/2017    EGFRIFAFRI 96 05/04/2017    BCR 21.3 05/04/2017    K 5.2 05/04/2017    CO2 30.1 (H) 05/04/2017    CALCIUM 10.4 05/04/2017    PROTENTOTREF 6.9 05/04/2017    ALBUMIN 4.40 12/20/2018    LABIL2 1.9 05/04/2017    AST 37 (H) 12/20/2018    ALT 38 (H) 12/20/2018       ASSESSMENT & PLAN      Diagnosis Plan   1. Palpitations     2. Supraventricular tachycardia (HCC)     3. Essential hypertension           1. History of supraventricular tachycardia. None on recent studies. No symptoms indicate that she is having any sustained SVT.   2. Palpitations. Consistent with PVC's.   3. Hypertension. Blood pressure is well controlled.  4. Hyperlipidemia. Followed by her primary provider.     Follow up with Lovely in 1 year.       Orders Placed This Encounter   Procedures   • ECG 12 Lead     This order was created via " procedure documentation     Order Specific Question:   Release to patient     Answer:   Immediate           MEDICATIONS         Discharge Medications          Accurate as of January 20, 2022 10:34 AM. If you have any questions, ask your nurse or doctor.            Continue These Medications      Instructions Start Date   amLODIPine 2.5 MG tablet  Commonly known as: NORVASC   2.5 mg, Oral, Daily      aspirin 81 MG EC tablet   81 mg, Oral, Daily      atorvastatin 20 MG tablet  Commonly known as: LIPITOR   20 mg, Oral, Daily      Caltrate 600 1500 (600 Ca) MG tablet  Generic drug: Calcium Carbonate   Oral, Daily      metoprolol succinate  MG 24 hr tablet  Commonly known as: TOPROL-XL   50 mg, Oral, 2 Times Daily      multivitamin tablet tablet  Commonly known as: THERAGRAN   Oral, Daily      Vitamin C Drops 60 MG lozenge  Generic drug: Ascorbic Acid   Mouth/Throat, Takes in the winter                Zoila Peraza MD  01/20/22  10:34 EST    **Barber Disclaimer:   Much of this encounter note is an electronic transcription/translation of spoken language to printed text. The electronic translation of spoken language may permit erroneous, or at times, nonsensical words or phrases to be inadvertently transcribed. Although I have reviewed the note for such errors, some may still exist.

## 2022-09-12 RX ORDER — METOPROLOL SUCCINATE 100 MG/1
TABLET, EXTENDED RELEASE ORAL
Qty: 90 TABLET | Refills: 1 | Status: SHIPPED | OUTPATIENT
Start: 2022-09-12

## 2022-09-12 RX ORDER — AMLODIPINE BESYLATE 2.5 MG/1
TABLET ORAL
Qty: 90 TABLET | Refills: 1 | Status: SHIPPED | OUTPATIENT
Start: 2022-09-12

## 2022-09-12 NOTE — TELEPHONE ENCOUNTER
Please advise filling     Metoprolol      AND     Amlodipine      LOV   -   1/20/2022  JL    Next   -   1/26/2023  JL    Last labs   -   Nothing since 2019 Scanned labs.    Torie VALDEZ

## 2022-12-16 RX ORDER — ATORVASTATIN CALCIUM 20 MG/1
TABLET, FILM COATED ORAL
Qty: 90 TABLET | Refills: 2 | Status: SHIPPED | OUTPATIENT
Start: 2022-12-16

## 2023-09-11 RX ORDER — ATORVASTATIN CALCIUM 20 MG/1
TABLET, FILM COATED ORAL
Qty: 90 TABLET | Refills: 1 | Status: SHIPPED | OUTPATIENT
Start: 2023-09-11

## 2023-09-11 NOTE — TELEPHONE ENCOUNTER
Last office visit was canceled.  I have approved more refills for statin therapy.  She needs a follow-up scheduled with me or Dr. Peraza

## 2023-10-12 ENCOUNTER — HOSPITAL ENCOUNTER (OUTPATIENT)
Dept: BONE DENSITY | Facility: HOSPITAL | Age: 68
Discharge: HOME | End: 2023-10-12
Admitting: INTERNAL MEDICINE
Payer: MEDICARE

## 2023-10-12 DIAGNOSIS — Z78.0 POSTMENOPAUSAL: ICD-10-CM

## 2023-10-12 PROCEDURE — 77080 DXA BONE DENSITY AXIAL: CPT

## 2023-11-27 ENCOUNTER — AMBULATORY SURGICAL CENTER (OUTPATIENT)
Dept: URBAN - METROPOLITAN AREA SURGERY 20 | Facility: SURGERY | Age: 68
End: 2023-11-27
Payer: MEDICARE

## 2023-11-27 DIAGNOSIS — R19.5 OTHER FECAL ABNORMALITIES: ICD-10-CM

## 2023-11-27 DIAGNOSIS — K57.30 DIVERTICULOSIS OF LARGE INTESTINE WITHOUT PERFORATION OR ABS: ICD-10-CM

## 2023-11-27 DIAGNOSIS — Z12.11 ENCOUNTER FOR SCREENING FOR MALIGNANT NEOPLASM OF COLON: ICD-10-CM

## 2023-11-27 DIAGNOSIS — K64.0 FIRST DEGREE HEMORRHOIDS: ICD-10-CM

## 2023-11-27 PROCEDURE — G0121 COLON CA SCRN NOT HI RSK IND: HCPCS | Performed by: INTERNAL MEDICINE
